# Patient Record
Sex: MALE | URBAN - METROPOLITAN AREA
[De-identification: names, ages, dates, MRNs, and addresses within clinical notes are randomized per-mention and may not be internally consistent; named-entity substitution may affect disease eponyms.]

---

## 2018-06-14 NOTE — PROCEDURE NOTE: CLINICAL
PROCEDURE NOTE: SLT OD. Diagnosis: Pseudoexfoliation Glaucoma, Severe. Anesthesia: Topical. Prep: Alphagan 0.15%. Prior to treatment, risks/benefits/alternatives discussed including infection, loss of vision, hemorrhage, cataract, glaucoma, retinal tears or detachment. Lens:  SLT laser lens with goniosol. Power: 1.2mJ. Total applications: 772. Application 15 degrees. Patient tolerated procedure well. There were no complications. Post-op instructions given. Post-op IOP = * mmHg. Naima Clemente

## 2018-06-14 NOTE — PATIENT DISCUSSION
The patient understands that SLT improves IOP 85% of the time. There are rare risks including inflammation or elevated IOP, but this is extremely uncommon. The lowering effect lasts 2-5 years and the procedure can be repeated.

## 2018-07-11 NOTE — PROCEDURE NOTE: CLINICAL
PROCEDURE NOTE: SLT OS. Diagnosis: Pseudoexfoliation Glaucoma, Severe. Prior to treatment, risks/benefits/alternatives discussed including infection, loss of vision, hemorrhage, cataract, glaucoma, retinal tears or detachment. Lens:  SLT laser lens with goniosol. Power: 1.2mJ. Total applications: 62. Application 740 degrees. Patient tolerated procedure well. There were no complications. Post-op instructions given. Post-op IOP = 12 mmHg. Shola Hansen

## 2018-07-11 NOTE — PATIENT DISCUSSION
Pt may have been confused about Pred directions after SLT last visit and may have used for longer than 7 days.

## 2018-07-11 NOTE — PATIENT DISCUSSION
Trouble pulling up consent for SLT today(computer not connecting to [facility]), had pt sign paper and will scan into documents.

## 2018-08-29 NOTE — PATIENT DISCUSSION
+ BLOCK, PT HAS RESTLESS LEG SYNDROME WILL REVIEW WITH DR. Lakhwinder Rollins THE POSSIBILITY OF SOME ISSUES DURING SX.

## 2018-10-02 NOTE — PATIENT DISCUSSION
+ BLOCK, PT HAS RESTLESS LEG SYNDROME WILL REVIEW WITH DR. Sherrine Sever THE POSSIBILITY OF SOME ISSUES DURING SX.

## 2018-10-03 NOTE — PATIENT DISCUSSION
+ BLOCK, PT HAS RESTLESS LEG SYNDROME WILL REVIEW WITH DR. Mae Irving THE POSSIBILITY OF SOME ISSUES DURING SX.

## 2020-09-23 ENCOUNTER — IMPORTED ENCOUNTER (OUTPATIENT)
Dept: URBAN - METROPOLITAN AREA CLINIC 9 | Facility: CLINIC | Age: 85
End: 2020-09-23

## 2021-10-16 ASSESSMENT — VISUAL ACUITY
OD_SC: 20/40 SN
OS_SC: 20/30 -2 SN
OD_CC: 20/20 SN
OS_CC: 20/20 SN

## 2021-10-16 ASSESSMENT — TONOMETRY
OD_IOP_MMHG: 16
OS_IOP_MMHG: 16

## 2022-07-06 RX ORDER — CARVEDILOL 6.25 MG/1
TABLET ORAL
COMMUNITY

## 2022-07-06 RX ORDER — ATORVASTATIN CALCIUM 80 MG/1
TABLET, FILM COATED ORAL
COMMUNITY
End: 2022-09-06 | Stop reason: SDUPTHER